# Patient Record
Sex: MALE | Race: BLACK OR AFRICAN AMERICAN | NOT HISPANIC OR LATINO | URBAN - METROPOLITAN AREA
[De-identification: names, ages, dates, MRNs, and addresses within clinical notes are randomized per-mention and may not be internally consistent; named-entity substitution may affect disease eponyms.]

---

## 2018-05-08 ENCOUNTER — TELEPHONE (OUTPATIENT)
Dept: INTERNAL MEDICINE | Facility: CLINIC | Age: 28
End: 2018-05-08

## 2018-05-08 NOTE — TELEPHONE ENCOUNTER
"Thaddeus Lundy is a 28 year old male who calls with chest tightness, SOB.    NURSING ASSESSMENT:  Description:  Pt is calling with symptoms of \"chest tightness and pressure.\" Pt says this has been going on for about 3-4 months. At the beginning it would  come and go, but now is more of a constant tightness, pressure, and discomfort. And it does get worse with activity/exertion. Pt will rest, and it will become less intense, but it \"doesn't seem to go away.\"  Pt also says that he \"has a hard time breathing.\" He will get very SOB, and \"can't breath.\" This used to happen at night when he was going to sleep/sleeping, but now it happens all day long. Pt denies any dizziness or lightheadedness. But he says that weakness seems to come with the breathing difficulties. He says when he breathes \"hard, heavy and deep, it feels like he can breathe a little better.\" No neck pain or stiffness. No jaw pain, no arm pain. He has a sore throat. Says he has lots of burping and indigestion. No nausea. He has trouble sleeping.  Pt was seen at [facility] 12/17/14--and was seen in the ED once in 2015 and once in 2016. Pt says he wants to make appt to see .  Advised that due to symptoms of chest tightness/pressure, breathing difficulties, weakness, pt needs to be evaluated in the ED. Informed that there is [facility] in [place], and [facility] in [place]. Please seek emergency care now, and please call an ambulance. After talking with pt, he says he is concerned, and that he will go to the ER now.     RECOMMENDED DISPOSITION:  Call 911 - due to \"continuous, or intermittent chest tightness and pressure, accompanied by SOB, weakness, breathing difficulties.\"  Will comply with recommendation: Unknown  If further questions/concerns or if symptoms do not improve, worsen or new symptoms develop, call your PCP or [facility] as soon as possible.      Guideline used:  Telephone Triage Protocols for Nurses, Fifth Edition, Milly" Domingo Azar, RN

## 2018-05-09 ENCOUNTER — HOSPITAL ENCOUNTER (EMERGENCY)
Facility: CLINIC | Age: 28
Discharge: HOME | End: 2018-05-09
Attending: EMERGENCY MEDICINE | Admitting: EMERGENCY MEDICINE
Payer: MEDICAID

## 2018-05-09 ENCOUNTER — APPOINTMENT (OUTPATIENT)
Dept: GENERAL RADIOLOGY | Facility: CLINIC | Age: 28
End: 2018-05-09
Attending: EMERGENCY MEDICINE
Payer: MEDICAID

## 2018-05-09 VITALS
DIASTOLIC BLOOD PRESSURE: 80 MMHG | WEIGHT: 157.85 LBS | SYSTOLIC BLOOD PRESSURE: 118 MMHG | OXYGEN SATURATION: 100 % | RESPIRATION RATE: 16 BRPM | BODY MASS INDEX: 23.31 KG/M2 | TEMPERATURE: 98.2 F | HEART RATE: 51 BPM

## 2018-05-09 DIAGNOSIS — R07.89 ATYPICAL CHEST PAIN: ICD-10-CM

## 2018-05-09 LAB
ANION GAP SERPL CALCULATED.3IONS-SCNC: 3 MMOL/L (ref 3–14)
BASOPHILS # BLD AUTO: 0.1 10E9/L (ref 0–0.2)
BASOPHILS NFR BLD AUTO: 0.9 %
BUN SERPL-MCNC: 14 MG/DL (ref 7–30)
CALCIUM SERPL-MCNC: 8.6 MG/DL (ref 8.5–10.1)
CHLORIDE SERPL-SCNC: 106 MMOL/L (ref 94–109)
CO2 SERPL-SCNC: 30 MMOL/L (ref 20–32)
CREAT SERPL-MCNC: 0.95 MG/DL (ref 0.66–1.25)
DIFFERENTIAL METHOD BLD: ABNORMAL
EOSINOPHIL # BLD AUTO: 1.6 10E9/L (ref 0–0.7)
EOSINOPHIL NFR BLD AUTO: 27.9 %
ERYTHROCYTE [DISTWIDTH] IN BLOOD BY AUTOMATED COUNT: 12.4 % (ref 10–15)
GFR SERPL CREATININE-BSD FRML MDRD: >90 ML/MIN/1.7M2
GLUCOSE SERPL-MCNC: 97 MG/DL (ref 70–99)
HCT VFR BLD AUTO: 39.8 % (ref 40–53)
HGB BLD-MCNC: 13.7 G/DL (ref 13.3–17.7)
IMM GRANULOCYTES # BLD: 0 10E9/L (ref 0–0.4)
IMM GRANULOCYTES NFR BLD: 0.2 %
INTERPRETATION ECG - MUSE: NORMAL
LYMPHOCYTES # BLD AUTO: 1.9 10E9/L (ref 0.8–5.3)
LYMPHOCYTES NFR BLD AUTO: 34.6 %
MCH RBC QN AUTO: 30.7 PG (ref 26.5–33)
MCHC RBC AUTO-ENTMCNC: 34.4 G/DL (ref 31.5–36.5)
MCV RBC AUTO: 89 FL (ref 78–100)
MONOCYTES # BLD AUTO: 0.6 10E9/L (ref 0–1.3)
MONOCYTES NFR BLD AUTO: 10 %
NEUTROPHILS # BLD AUTO: 1.5 10E9/L (ref 1.6–8.3)
NEUTROPHILS NFR BLD AUTO: 26.4 %
NRBC # BLD AUTO: 0 10*3/UL
NRBC BLD AUTO-RTO: 0 /100
PLATELET # BLD AUTO: 202 10E9/L (ref 150–450)
POTASSIUM SERPL-SCNC: 3.8 MMOL/L (ref 3.4–5.3)
RBC # BLD AUTO: 4.46 10E12/L (ref 4.4–5.9)
SODIUM SERPL-SCNC: 139 MMOL/L (ref 133–144)
TROPONIN I SERPL-MCNC: <0.015 UG/L (ref 0–0.04)
WBC # BLD AUTO: 5.6 10E9/L (ref 4–11)

## 2018-05-09 PROCEDURE — 71046 X-RAY EXAM CHEST 2 VIEWS: CPT

## 2018-05-09 PROCEDURE — 85025 COMPLETE CBC W/AUTO DIFF WBC: CPT | Performed by: EMERGENCY MEDICINE

## 2018-05-09 PROCEDURE — 84484 ASSAY OF TROPONIN QUANT: CPT | Performed by: EMERGENCY MEDICINE

## 2018-05-09 PROCEDURE — 80048 BASIC METABOLIC PNL TOTAL CA: CPT | Performed by: EMERGENCY MEDICINE

## 2018-05-09 PROCEDURE — 99285 EMERGENCY DEPT VISIT HI MDM: CPT | Mod: 25

## 2018-05-09 PROCEDURE — 93005 ELECTROCARDIOGRAM TRACING: CPT

## 2018-05-09 PROCEDURE — 36415 COLL VENOUS BLD VENIPUNCTURE: CPT | Performed by: EMERGENCY MEDICINE

## 2018-05-09 ASSESSMENT — ENCOUNTER SYMPTOMS
BLOOD IN STOOL: 0
CHEST TIGHTNESS: 1
TROUBLE SWALLOWING: 0
SHORTNESS OF BREATH: 1
SORE THROAT: 1

## 2018-05-09 NOTE — TELEPHONE ENCOUNTER
Dr. Simpson,     Pt is currently in the [facility] Emergency Department right now as I write this note to you.     Linda.ALFIE Smith ([facility])

## 2018-05-09 NOTE — ED AVS SNAPSHOT
[facility] Emergency Department    [address]    [facility] [address]    Phone:  [phone]    Fax:  [phone]                                       Thaddeus Lundy   MRN: [id number]    Department:  [facility] Emergency Department   Date of Visit:  5/9/2018           Patient Information     Date Of Birth          1990        Your diagnoses for this visit were:     Atypical chest pain        You were seen by Bravo Koenig MD.      Follow-up Information     Follow up with [facility] In 3 days.    Specialties:  Internal Medicine, Pediatrics, Obstetrics & Gynecology    Why:  As needed    Contact information:    [address] [address] [phone]        Discharge Instructions       Discharge Instructions  Chest Pain    You have been seen today for chest pain or discomfort.  At this time, your doctor has found no signs that your chest pain is due to a serious or life-threatening condition, (or you have declined more testing and/or admission to the hospital). However, sometimes there is a serious problem that does not show up right away. Your evaluation today may not be complete and you may need further testing and evaluation.     You need to follow-up with your regular doctor within 3 days.    Return to the Emergency Department if:    Your chest pain changes, gets worse, starts to happen more often, or comes with less activity.    You are short of breath.    You get very weak or tired.    You pass out or faint.    You have any new symptoms, like fever, cough, numb legs, or you cough up blood.    You have anything else that worries you.    Until you follow-up with your regular doctor please do the following:    Take one aspirin daily unless you have an allergy or are told not to by your doctor.    If a stress test appointment has been made, go to the appointment.    If you have questions, contact your  regular doctor.    If your doctor today has told you to follow-up with your regular doctor, it is very important that you make an appointment with your clinic and go to the appointment.  If you do not follow-up with your primary doctor, it may result in missing an important development which could result in permanent injury or disability and/or lasting pain.  If there is any problem keeping your appointment, call your doctor or return to the Emergency Department.    If you were given a prescription for medicine here today, be sure to read all of the information (including the package insert) that comes with your prescription.  This will include important information about the medicine, its side effects, and any warnings that you need to know about.  The pharmacist who fills the prescription can provide more information and answer questions you may have about the medicine.  If you have questions or concerns that the pharmacist cannot address, please call or return to the Emergency Department.     Opioid Medication Information    Pain medications are among the most commonly prescribed medicines, so we are including this information for all our patients. If you did not receive pain medication or get a prescription for pain medicine, you can ignore it.     You may have been given a prescription for an opioid (narcotic) pain medicine and/or have received a pain medicine while here in the Emergency Department. These medicines can make you drowsy or impaired. You must not drive, operate dangerous equipment, or engage in any other dangerous activities while taking these medications. If you drive while taking these medications, you could be arrested for DUI, or driving under the influence. Do not drink any alcohol while you are taking these medications.     Opioid pain medications can cause addiction. If you have a history of chemical dependency of any type, you are at a higher risk of becoming addicted to pain medications.   Only take these prescribed medications to treat your pain when all other options have been tried. Take it for as short a time and as few doses as possible. Store your pain pills in a secure place, as they are frequently stolen and provide a dangerous opportunity for children or visitors in your house to start abusing these powerful medications. We will not replace any lost or stolen medicine.  As soon as your pain is better, you should flush all your remaining medication.     Many prescription pain medications contain Tylenol  (acetaminophen), including Vicodin , Tylenol #3 , Norco , Lortab , and Percocet .  You should not take any extra pills of Tylenol  if you are using these prescription medications or you can get very sick.  Do not ever take more than 3000 mg of acetaminophen in any 24 hour period.    All opioids tend to cause constipation. Drink plenty of water and eat foods that have a lot of fiber, such as fruits, vegetables, prune juice, apple juice and high fiber cereal.  Take a laxative if you don t move your bowels at least every other day. Miralax , Milk of Magnesia, Colace , or Senna  can be used to keep you regular.      Remember that you can always come back to the Emergency Department if you are not able to see your regular doctor in the amount of time listed above, if you get any new symptoms, or if there is anything that worries you.          24 Hour Appointment Hotline       To make an appointment at any [facility], call [phone] ([phone]). If you don't have a family doctor or clinic, we will help you find one. [facility] clinics are conveniently located to serve the needs of you and your family.             Review of your medicines      START taking        Dose / Directions Last dose taken    omeprazole 20 MG CR capsule   Commonly known as:  priLOSEC   Dose:  20 mg   Quantity:  60 capsule        Take 1 capsule (20 mg) by mouth 2 times daily   Refills:  0                 Prescriptions were sent or printed at these locations (1 Prescription)                   Other Prescriptions                Printed at Department/Unit printer (1 of 1)         omeprazole (PRILOSEC) 20 MG CR capsule                Procedures and tests performed during your visit     Basic metabolic panel (BMP)    CBC + differential    Chest XR,  PA & LAT    EKG 12-lead, tracing only    Troponin I (now)      Orders Needing Specimen Collection     None      Pending Results     No orders found from 5/7/2018 to 5/10/2018.            Pending Culture Results     No orders found from 5/7/2018 to 5/10/2018.            Pending Results Instructions     If you had any lab results that were not finalized at the time of your Discharge, you can call the ED Lab Result RN at [phone]. You will be contacted by this team for any positive Lab results or changes in treatment. The nurses are available 7 days a week from 10A to 6:30P.  You can leave a message 24 hours per day and they will return your call.        Test Results From Your Hospital Stay        5/9/2018  9:02 AM      Narrative     XR CHEST 2 VW  5/9/2018 8:57 AM    HISTORY:  Chest pain. Dyspnea. Smoking.    COMPARISON:  None.        Impression     IMPRESSION:  Hyperinflation. Otherwise negative.     MATILDA VOSS MD         5/9/2018  9:00 AM      Component Results     Component Value Ref Range & Units Status    Sodium 139 133 - 144 mmol/L Final    Potassium 3.8 3.4 - 5.3 mmol/L Final    Chloride 106 94 - 109 mmol/L Final    Carbon Dioxide 30 20 - 32 mmol/L Final    Anion Gap 3 3 - 14 mmol/L Final    Glucose 97 70 - 99 mg/dL Final    Urea Nitrogen 14 7 - 30 mg/dL Final    Creatinine 0.95 0.66 - 1.25 mg/dL Final    GFR Estimate >90 >60 mL/min/1.7m2 Final    Non  GFR Calc    GFR Estimate If Black >90 >60 mL/min/1.7m2 Final    African American GFR Calc    Calcium 8.6 8.5 - 10.1 mg/dL Final         5/9/2018  8:47 AM      Component Results     Component Value  Ref Range & Units Status    WBC 5.6 4.0 - 11.0 10e9/L Final    RBC Count 4.46 4.4 - 5.9 10e12/L Final    Hemoglobin 13.7 13.3 - 17.7 g/dL Final    Hematocrit 39.8 (L) 40.0 - 53.0 % Final    MCV 89 78 - 100 fl Final    MCH 30.7 26.5 - 33.0 pg Final    MCHC 34.4 31.5 - 36.5 g/dL Final    RDW 12.4 10.0 - 15.0 % Final    Platelet Count 202 150 - 450 10e9/L Final    Diff Method Automated Method  Final    % Neutrophils 26.4 % Final    % Lymphocytes 34.6 % Final    % Monocytes 10.0 % Final    % Eosinophils 27.9 % Final    % Basophils 0.9 % Final    % Immature Granulocytes 0.2 % Final    Nucleated RBCs 0 0 /100 Final    Absolute Neutrophil 1.5 (L) 1.6 - 8.3 10e9/L Final    Absolute Lymphocytes 1.9 0.8 - 5.3 10e9/L Final    Absolute Monocytes 0.6 0.0 - 1.3 10e9/L Final    Absolute Eosinophils 1.6 (H) 0.0 - 0.7 10e9/L Final    Absolute Basophils 0.1 0.0 - 0.2 10e9/L Final    Abs Immature Granulocytes 0.0 0 - 0.4 10e9/L Final    Absolute Nucleated RBC 0.0  Final         5/9/2018  9:00 AM      Component Results     Component Value Ref Range & Units Status    Troponin I ES <0.015 0.000 - 0.045 ug/L Final    The 99th percentile for upper reference range is 0.045 ug/L.  Troponin values   in the range of 0.045 - 0.120 ug/L may be associated with risks of adverse   clinical events.                  Clinical Quality Measure: Blood Pressure Screening     Your blood pressure was checked while you were in the emergency department today. The last reading we obtained was  BP: 111/86 . Please read the guidelines below about what these numbers mean and what you should do about them.  If your systolic blood pressure (the top number) is less than 120 and your diastolic blood pressure (the bottom number) is less than 80, then your blood pressure is normal. There is nothing more that you need to do about it.  If your systolic blood pressure (the top number) is 120-139 or your diastolic blood pressure (the bottom number) is 80-89, your blood  "pressure may be higher than it should be. You should have your blood pressure rechecked within a year by a primary care provider.  If your systolic blood pressure (the top number) is 140 or greater or your diastolic blood pressure (the bottom number) is 90 or greater, you may have high blood pressure. High blood pressure is treatable, but if left untreated over time it can put you at risk for heart attack, stroke, or kidney failure. You should have your blood pressure rechecked by a primary care provider within the next 4 weeks.  If your provider in the emergency department today gave you specific instructions to follow-up with your doctor or provider even sooner than that, you should follow that instruction and not wait for up to 4 weeks for your follow-up visit.        Thank you for choosing [facility]       Thank you for choosing [facility] for your care. Our goal is always to provide you with excellent care. Hearing back from our patients is one way we can continue to improve our services. Please take a few minutes to complete the written survey that you may receive in the mail after you visit with us. Thank you!        [employer] Information     [employer] lets you send messages to your doctor, view your test results, renew your prescriptions, schedule appointments and more. To sign up, go to [email or web address] . Click on \"Log in\" on the left side of the screen, which will take you to the Welcome page. Then click on \"Sign up Now\" on the right side of the page.     You will be asked to enter the access code listed below, as well as some personal information. Please follow the directions to create your username and password.     Your access code is: [email or web address]  Expires: 2018  9:12 AM     Your access code will  in 90 days. If you need help or a new code, please call your [facility] clinic or [phone].        Care EveryWhere ID     This is your Care EveryWhere ID. This could be used by other organizations " to access your [place] medical records  [id number]        Equal Access to Services     FRANSISCA PALAFOX : Griselda Torres, odalis humphries, alessandro grimm. So [facility] [phone].    ATENCIÓN: Si habla español, tiene a horner disposición servicios gratuitos de asistencia lingüística. Llame al [phone].    We comply with applicable federal civil rights laws and [place] laws. We do not discriminate on the basis of race, color, national origin, age, disability, sex, sexual orientation, or gender identity.            After Visit Summary       This is your record. Keep this with you and show to your community pharmacist(s) and doctor(s) at your next visit.

## 2018-05-09 NOTE — ED TRIAGE NOTES
Pt presents with having chest pain and SOB for the past month, called this AM and was instructed to come to ED. ABC's intact A&Ox.4 [place] the [place].

## 2018-05-09 NOTE — ED AVS SNAPSHOT
[facility] Emergency Department    [address]    [facility] [address]    Phone:  [phone]    Fax:  [phone]                                       Thaddeus Lundy   MRN: [id number]    Department:  [facility] Emergency Department   Date of Visit:  5/9/2018           After Visit Summary Signature Page     I have received my discharge instructions, and my questions have been answered. I have discussed any challenges I see with this plan with the nurse or doctor.    ..........................................................................................................................................  Patient/Patient Representative Signature      ..........................................................................................................................................  Patient Representative Print Name and Relationship to Patient    ..................................................               ................................................  Date                                            Time    ..........................................................................................................................................  Reviewed by Signature/Title    ...................................................              ..............................................  Date                                                            Time

## 2018-05-09 NOTE — TELEPHONE ENCOUNTER
Given duration of months of sx- office visit would be ok with anyone , any provider, any clinic.  I do not see that he went to any of our ERs. We should f/u with him by call today and make sure he was seen somewhere.

## 2018-05-17 ENCOUNTER — OFFICE VISIT (OUTPATIENT)
Dept: INTERNAL MEDICINE | Facility: CLINIC | Age: 28
End: 2018-05-17
Payer: MEDICAID

## 2018-05-17 VITALS
HEART RATE: 63 BPM | HEIGHT: 67 IN | DIASTOLIC BLOOD PRESSURE: 74 MMHG | RESPIRATION RATE: 16 BRPM | BODY MASS INDEX: 25.24 KG/M2 | SYSTOLIC BLOOD PRESSURE: 112 MMHG | OXYGEN SATURATION: 99 % | TEMPERATURE: 98.2 F | WEIGHT: 160.8 LBS

## 2018-05-17 DIAGNOSIS — R07.89 ATYPICAL CHEST PAIN: Primary | ICD-10-CM

## 2018-05-17 DIAGNOSIS — Z72.0 TOBACCO ABUSE: ICD-10-CM

## 2018-05-17 PROCEDURE — 99214 OFFICE O/P EST MOD 30 MIN: CPT | Performed by: INTERNAL MEDICINE

## 2018-05-17 ASSESSMENT — PAIN SCALES - GENERAL: PAINLEVEL: NO PAIN (0)

## 2018-05-17 NOTE — PROGRESS NOTES
SUBJECTIVE:   Thaddeus Lundy is a 28 year old male who presents to clinic today for the following health issues:    New patient to Dr. Savage. No routine provider but has seen Dr. Simpson several years ago    Patient states that he is feeling somewhat better although he feels that he gets really achy and muscle aching and taking the omeprazole twice daily.  Reports increasing reflux and acid indigestion symptoms notable with caffeine so he is cut back.  He denies any classic anginal complaints.  He also states his symptoms are sometimes worse when he lays down and he gets a lot of burping and belching.    ED/UC Followup:    Facility:  [facility]  Date of visit: 5/9/18  Reason for visit: chest pain, atypical  Current Status: Stable      Medical Decision Making:  Patient presents with vague chest pain and shortness of breath.  Patient is very well-appearing.  His vitals are stable.  He is at no risk for pulmonary embolism and the risk of a CT scan is greater than his risk for a pulmonary embolism and therefore in investigation and this was not performed.  Due to smoking patient was sent for chest x-ray to rule out pneumothorax and was normal.  EKG and troponin are negative.  Patient was offered reassurance and follow-up with primary care.  Patient is concerned about reflux and therefore a Prilosec prescription was given as a bridge to follow-up with primary care.  He currently states he is not smoking.     Diagnosis:      ICD-10-CM   1. Atypical chest pain R07.89     Problem list and histories reviewed & adjusted, as indicated.  Additional history: as documented    Patient Active Problem List   Diagnosis     CARDIOVASCULAR SCREENING; LDL GOAL LESS THAN 160     No past surgical history on file.    Social History   Substance Use Topics     Smoking status: Former Smoker     Types: Cigarettes, Hookah     Quit date: 1/1/2017     Smokeless tobacco: Never Used      Comment: smokes [employer]     Alcohol use 7.5 oz/week     15  "Standard drinks or equivalent per week      Comment: socially     Family History   Problem Relation Age of Onset     Family History Negative Mother      Family History Negative Father      Family History Negative Sister      Family History Negative Brother          Current Outpatient Prescriptions   Medication Sig Dispense Refill     omeprazole (PRILOSEC) 20 MG CR capsule Take 1 capsule (20 mg) by mouth 2 times daily 60 capsule 0     No Known Allergies  BP Readings from Last 3 Encounters:   05/09/18 118/80   03/08/16 123/69   09/20/15 108/73    Wt Readings from Last 3 Encounters:   05/09/18 157 lb 13.6 oz (71.6 kg)   12/17/14 162 lb 11.2 oz (73.8 kg)                    Reviewed and updated as needed this visit by clinical staff       Reviewed and updated as needed this visit by Provider         ROS:  CONSTITUTIONAL: NEGATIVE for fever, chills, change in weight  ENT/MOUTH: NEGATIVE for ear, mouth and throat problems  CV: NEGATIVE for chest pain, palpitations or peripheral edema  GI: NEGATIVE for nausea, abdominal pain, heartburn, or change in bowel habits  : NEGATIVE for frequency, dysuria, or hematuria  MUSCULOSKELETAL: NEGATIVE for significant arthralgias or myalgia  HEME: NEGATIVE for bleeding problems  PSYCHIATRIC: NEGATIVE for changes in mood or affect    OBJECTIVE:                                                    /74  Pulse 63  Temp 98.2  F (36.8  C) (Oral)  Resp 16  Ht 5' 7\" (1.702 m)  Wt 160 lb 12.8 oz (72.9 kg)  SpO2 99%  BMI 25.18 kg/m2  Body mass index is 25.18 kg/(m^2).  GENERAL: healthy, alert and no distress  RESP: lungs clear to auscultation - no rales, no rhonchi, no wheezes  CV: regular rates and rhythm, normal S1 S2, no S3 or S4 and no click or rub   MS: extremities- no gross deformities noted  PSYCH: Alert and oriented times 3; speech- coherent , normal rate and volume; able to articulate logical thoughts, able to abstract reason, no tangential thoughts, no hallucinations or " delusions, affect- normal     ASSESSMENT/PLAN:                                                      (R07.89) Atypical chest pain  (primary encounter diagnosis)  Comment: Suspect related to esophageal source.  I have suggested that if he does not tolerate his twice daily dose of omeprazole that he may want to consider taking 1 ranitidine 150 mg tablet in the morning and taking his 20 mg of omeprazole in the evening thus hopefully while he sleeping he may not have these symptoms.  If with dietary change and conservative measures as well as medication his symptoms do not improve I have given him a GI referral for further assessment.  Plan: GASTROENTEROLOGY ADULT REF CONSULT ONLY            (Z72.0) Tobacco abuse  Comment: Ongoing smoking cessation was suggested  Plan:       See Patient Instructions    Sánchez Savage MD  [facility]

## 2018-05-17 NOTE — MR AVS SNAPSHOT
After Visit Summary   5/17/2018    Thaddeus Lundy    MRN: [id number]           Patient Information     Date Of Birth          1990        Visit Information        Provider Department      5/17/2018 4:00 PM Sánchez Savage MD [facility]        Today's Diagnoses     Atypical chest pain    -  1    Tobacco abuse           Follow-ups after your visit        Additional Services     GASTROENTEROLOGY ADULT REF CONSULT ONLY       Preferred Location: [facility] [phone]      Please be aware that coverage of these services is subject to the terms and limitations of your health insurance plan.  Call member services at your health plan with any benefit or coverage questions.  Any procedures must be performed at a [place] facility OR coordinated by your clinic's referral office.    Please bring the following with you to your appointment:    (1) Any X-Rays, CTs or MRIs which have been performed.  Contact the facility where they were done to arrange for  prior to your scheduled appointment.    (2) List of current medications   (3) This referral request   (4) Any documents/labs given to you for this referral                  Who to contact     If you have questions or need follow up information about today's clinic visit or your schedule please contact [facility] directly at [phone].  Normal or non-critical lab and imaging results will be communicated to you by MyChart, letter or phone within 4 business days after the clinic has received the results. If you do not hear from us within 7 days, please contact the clinic through MyChart or phone. If you have a critical or abnormal lab result, we will notify you by phone as soon as possible.  Submit refill requests through [facility] or call your pharmacy and they will forward the refill request to us. Please allow 3 business days for your refill to be completed.          Additional Information About Your  "Visit        A-STAR Information     [employer] lets you send messages to your doctor, view your test results, renew your prescriptions, schedule appointments and more. To sign up, go to [email or web address] . Click on \"Log in\" on the left side of the screen, which will take you to the Welcome page. Then click on \"Sign up Now\" on the right side of the page.     You will be asked to enter the access code listed below, as well as some personal information. Please follow the directions to create your username and password.     Your access code is: [email or web address]  Expires: 2018  9:12 AM     Your access code will  in 90 days. If you need help or a new code, please call your [place] clinic or [phone].        Care EveryWhere ID     This is your Care EveryWhere ID. This could be used by other organizations to access your [facility] medical records  [id number]        Your Vitals Were     Pulse Temperature Respirations Height Pulse Oximetry BMI (Body Mass Index)    63 98.2  F (36.8  C) (Oral) 16 5' 7\" (1.702 m) 99% 25.18 kg/m2       Blood Pressure from Last 3 Encounters:   18 112/74   18 118/80   16 123/69    Weight from Last 3 Encounters:   18 160 lb 12.8 oz (72.9 kg)   18 157 lb 13.6 oz (71.6 kg)   14 162 lb 11.2 oz (73.8 kg)              We Performed the Following     GASTROENTEROLOGY ADULT REF CONSULT ONLY        Primary Care Provider Fax #    Physician No Ref-Primary [phone]       No address on file        Equal Access to Services     FRANSISCA PALAFOX : Hadii wisam Torres, odalis humphries, qahalleta kaalmada tae, alessandro weathers . So [facility] [phone].    ATENCIÓN: Si habla español, tiene a horner disposición servicios gratuitos de asistencia lingüística. Llame al [phone].    We comply with applicable federal civil rights laws and [place] laws. We do not discriminate on the basis of race, color, national origin, age, disability, " sex, sexual orientation, or gender identity.            Thank you!     Thank you for choosing [facility]  for your care. Our goal is always to provide you with excellent care. Hearing back from our patients is one way we can continue to improve our services. Please take a few minutes to complete the written survey that you may receive in the mail after your visit with us. Thank you!             Your Updated Medication List - Protect others around you: Learn how to safely use, store and throw away your medicines at www.disposemymeds.org.          This list is accurate as of 5/17/18  4:02 PM.  Always use your most recent med list.                   Brand Name Dispense Instructions for use Diagnosis    omeprazole 20 MG CR capsule    priLOSEC    60 capsule    Take 1 capsule (20 mg) by mouth 2 times daily